# Patient Record
Sex: FEMALE | Race: WHITE
[De-identification: names, ages, dates, MRNs, and addresses within clinical notes are randomized per-mention and may not be internally consistent; named-entity substitution may affect disease eponyms.]

---

## 2018-09-13 ENCOUNTER — HOSPITAL ENCOUNTER (OUTPATIENT)
Dept: HOSPITAL 61 - PNCL | Age: 28
Discharge: HOME | End: 2018-09-13
Attending: ANESTHESIOLOGY
Payer: COMMERCIAL

## 2018-09-13 DIAGNOSIS — Z88.6: ICD-10-CM

## 2018-09-13 DIAGNOSIS — M50.123: Primary | ICD-10-CM

## 2018-09-13 PROCEDURE — 62321 NJX INTERLAMINAR CRV/THRC: CPT

## 2018-09-13 NOTE — PAIN
DATE OF SERVICE:  09/13/2018



PROGRESS NOTE FOR PAIN CLINIC



DIAGNOSIS:  Cervical radiculopathy with cervical degenerative disk disease.



HISTORY OF PRESENT ILLNESS:  The patient is a 28-year-old female who returns for

followup status post previous cervical epidural steroid injection, last seen

11/09/2017.  The patient did very well with about 90% improvement after the last

injection, but the patient reports the pain is returning now.  The patient

recently had a child about 2 months ago and during the pregnancy was getting

much worse with the pain in the neck and the shoulders bilaterally into the

upper extremities, slightly more on the left than the right, but present

bilaterally at base of the neck, shoulders, upper arms, forearms, mostly in the

posterior aspect radiating into the fingers with numbness and tingling.  The

patient reports it is radiating, shooting, aching now, on and off in intensity,

but dull and throbbing in the neck as well.  The patient reports it is a 7 on a

scale of 10 at its worst, 5 on average, 2 at its least and is a 5 today.  The

patient reports no new motor or sensory deficits, better with resting, worse

with activity, changing positions, using her upper extremities, lifting any

items especially the baby and doing repetitive motions overhead.  The patient

reports no new motor and sensory deficits.  Again, sleeps well at night.



PHYSICAL EXAMINATION:

VITAL SIGNS:  The patient's blood pressure 139/79, pulse 84, respirations 18,

temperature 98.6 degrees Fahrenheit, height is 5 feet 2 inches, weight is 217

pounds.

GENERAL:  The patient is awake, alert, oriented, appropriate, very pleasant

demeanor.

HEENT:  Head shows normocephalic, atraumatic.  Extraocular movements are intact

and symmetrical.  Oral cavity:  Mucous membranes are moist and pink.  Dentition

is intact.

NECK:  Shows anterior throat supple without palpable lymphadenopathy noted. 

Swallow reflex is symmetrical.

CHEST:  Shows normal on inspection.  Breath sounds are clear to auscultation

bilaterally.

HEART:  Shows S1, S2 clear.  No murmurs auscultated.

ABDOMEN:  Soft, nontender, nondistended.  No palpable organomegaly is noted.  No

rebound or guarding demonstrated.

MUSCULOSKELETAL:  Back shows spine grossly in the midline.  Normal appearing

cervical lordotic curvature, minor increase in thoracic kyphotic curvature. 

Cervical paraspinous muscle shows symmetrical on inspection, shows moderate

tenderness with palpation in the inferior aspect of the cervical paraspinous

musculature, but only diffusely without radiation.  The patient's neck shows

good rotational motion.  Minor tenderness with extension, but not with forward

flexion.  The patient's upper extremities show deep tendon reflexes at 2+ in the

biceps and triceps tendons.  Motor exam is strong with  strength rated at

5/5, as is bicep and tricep flexion and symmetrical.  Peripheral pulses are 1+

posterior tibia.  No peripheral edema is noted.



Options were discussed with the patient.  The patient's old chart was reviewed

as her current medication regimen updated.  Current review of systems updated

today as well.  We will proceed with a cervical epidural steroid injection today

with fluoroscopic guidance.  Risks were again discussed including, but not

limited to bleeding, infection, possibility of epidural hematoma and subsequent

neurological compromise, dural puncture, headaches, spinal cord and/or nerve

damage, side effects of steroid medication and poor results regarding pain

control.  The patient understands and wished to proceed.  The patient will

return to the clinic in approximately 2 weeks for followup, was counseled on

return appointment, activity level and side effects to be aware of.



DIAGNOSIS:  Cervical radiculopathy with cervical degenerative disk disease.



PROCEDURE:  Cervical epidural steroid injection in translaminar approach at

C6-C7 level using C-arm fluoroscopic guidance under sterile prep and drape using

local anesthetic.



MEDICATION INJECTED:  A total of 120 mg Depo-Medrol plus 5 mL of

preservative-free normal saline and 2 mL of Isovue for contrast.



CONDITION AT DISCHARGE:  Stable.  The patient tolerated the procedure well, had

no complications.

 



______________________________

VIVIENNE SANDERS MD



DR:  PENNY/antonina  JOB#:  [id number] / [id number]

DD:  09/13/2018 12:04  DT:  09/13/2018 17:06

## 2018-12-28 ENCOUNTER — HOSPITAL ENCOUNTER (OUTPATIENT)
Dept: HOSPITAL 61 - PNCL | Age: 28
Discharge: HOME | End: 2018-12-28
Attending: ANESTHESIOLOGY
Payer: COMMERCIAL

## 2018-12-28 DIAGNOSIS — M50.123: Primary | ICD-10-CM

## 2018-12-28 DIAGNOSIS — Z88.8: ICD-10-CM

## 2018-12-28 PROCEDURE — 62321 NJX INTERLAMINAR CRV/THRC: CPT

## 2018-12-28 NOTE — PAIN
DATE OF SERVICE:  12/28/2018



PROGRESS NOTE FOR PAIN CLINIC



DIAGNOSES:  Cervical radiculopathy with cervical degenerative disk disease.



HISTORY OF PRESENT ILLNESS:  The patient is a 28-year-old female who returns for

followup status post cervical epidural steroid injection x 1.  The patient

reports about 40% improvement after the first injection, still some pain in the

base of the neck and right greater than left upper extremities, radiating to the

arms and hands with some tingling, cramping, aching, sharpness type pain,

shooting pain becoming more constant, but better after the last injection.  The

patient reports she was increasing her activity at work as well as household

activities, has a new baby at home as well and has been increasing her activity

with that duty.  Also, the patient reports no new motor or sensory deficits, no

new changes.  The patient reports her pain is an 8 on a scale of 10 at its

worst, 5 on average and 1 at its least and is a 5 today.



PHYSICAL EXAMINATION:

VITAL SIGNS:  The patient's blood pressure 129/78, pulse 84, respirations 16,

temperature is 98.4 degrees Fahrenheit, height is 5 feet 2 inches, weight is 221

pounds.

GENERAL:  The patient is awake, alert, oriented, appropriate, very pleasant

demeanor.

HEENT:  Head shows normocephalic, atraumatic.  Extraocular movements intact and

symmetrical.  Oral cavity:  Mucous membranes moist and pink.  Dentition is

intact.

NECK:  Shows anterior throat supple without palpable lymphadenopathy noted. 

Swallow reflex symmetrical.

CHEST:  Shows normal on inspection.  Breath sounds clear to auscultation

bilaterally.

HEART:  Shows S1, S2 clear.  No murmurs auscultated.

ABDOMEN:  Soft, nontender, nondistended.  No palpable organomegaly is noted.  No

rebound or guarding demonstrated.

BACK:  Shows spine grossly in the midline, normal-appearing cervical lordotic

curvature.  Some increase in thoracic kyphotic curvature.  Cervical paraspinous

muscle shows symmetrical on inspection, on palpation shows some moderate

tenderness but only diffusely bilaterally without radiation.

EXTREMITIES:  The patient's upper extremities show deep tendon reflexes 2+ in

the biceps and triceps tendons.  Motor exam is strong with 5/5  strength,

bicep and tricep flexion and equal.  Peripheral pulses are 2+ in radial

distribution.  The patient's neck shows good rotational motion both laterally

greater than 45 degrees right and left as well as full extension, full forward

flexion without significant pain reported.



PLAN:  Options were discussed with the patient.  The patient's old chart was

reviewed as her current medication regimen updated.  Current review of systems

updated today as well.  We will proceed with a second in this series of cervical

epidural steroid injection today with fluoroscopic guidance.  Risks were again

discussed including, but not limited to bleeding, infection, possibility of

epidural hematoma, subsequent neurological compromise, dural puncture,

headaches, spinal cord and/or nerve damage, side effects of steroid medication

and poor results regarding pain control.  The patient understands and wished to

proceed.  The patient will return to the clinic in approximately 2 weeks for

followup, was counseled on return appointment, activity level and side effects

to be aware of.



DIAGNOSES:  Cervical radiculopathy with cervical degenerative disk disease.



PROCEDURE:  Cervical epidural steroid injection, translaminar approach at C6-C7

level using C-arm fluoroscopic guidance under sterile prep and drape using local

anesthetic.



MEDICATION INJECTED:  A total of 120 mg Depo-Medrol plus 5 mL of

preservative-free normal saline and 2 mL of Isovue for contrast.



CONDITION AT CONDITION AT DISCHARGE:  Stable.  The patient tolerated the

procedure well, had no complications.

 



______________________________

VIVIENNE ASNDERS MD



DR:  PENNY/antonina  JOB#:  [id number] / [id number]

DD:  12/28/2018 11:00  DT:  12/28/2018 11:17

## 2021-07-27 ENCOUNTER — HOSPITAL ENCOUNTER (OUTPATIENT)
Dept: HOSPITAL 61 - PNCL | Age: 31
Discharge: HOME | End: 2021-07-27
Attending: ANESTHESIOLOGY
Payer: COMMERCIAL

## 2021-07-27 DIAGNOSIS — M50.10: Primary | ICD-10-CM

## 2021-07-27 PROCEDURE — G0463 HOSPITAL OUTPT CLINIC VISIT: HCPCS

## 2021-07-27 PROCEDURE — 99212 OFFICE O/P EST SF 10 MIN: CPT

## 2021-07-27 NOTE — PDOC
Progress Note - Pain Clinic


Date of Service:


DOS:


DATE: 7/27/21 


TIME: 11:01





Diagnosis:


Dx:


Cervical radiculopathy with cervical degenerative disc disease





History or Present Illness:


HPI:


31-year-old female returns follow-up status post cervical epidural steroid 

injections last seen December 2018.  Patient reports she did very well about 90%

improvement for about a year following the injection patient reports that over 

the past year though she has had some increasing pain the base the neck and 

shoulders right upper extremity left upper extremity equal somewhat worse on the

right at times but is right-hand dominant and feels it may be just overuse with 

the right upper extremity.  Patient reports otherwise the pain is radiating the 

posterior deltoid posterior triceps into the forearms mostly the posterior side 

but also anteriorly and into the fingers and hands with numbness and tingling.  

Patient reports some fatigability but no overt motor loss of the upper 

extremities with the pain or activity.  Patient report is worse with repetitive 

activity reaching, reaching up overhead with her hands driving repetitive 

motions and weightbearing and lifting.  Patient reports generally better with 

resting or laying down does not generally awaken her from sleep.  Patient rates 

her pain as an 8 on scale 10 is worse over the past week 6 on average 3 at its 

least and is a 3 today.  Patient is still doing some chiropractic treatment 

regularly also doing stretching strengthening exercises on a regular basis.  

Patient has been taking Zanaflex and exercising Tylenol which does decrease the 

pain by about 50% when combined.





Physical Exam:


VS:


Blood pressure is 156/89 pulse 107 respirations 18 temperature is 99.1 F height

is 5 feet 2 inches weight is 204 pounds


PE:


PHYSICAL EXAMINATION:





GENERAL: The patient is awake, alert, oriented, appropriate, very pleasant in 

demeanor.


HEENT: Shows normocephalic, atraumatic.  Extraocular movements are intact and 

symmetrical.  Oral cavity: Mucous membranes moist and pink.  Dentition is 

intact.


NECK: Shows anterior throat supple without palpable lymphadenopathy noted.  

Swallow reflex symmetrical.


CHEST: Shows normal on inspection.  Breath sounds are clear bilaterally, distant

but no rales rhonchi or wheezes auscultated.


HEART: Shows S1, S2 clear.  No murmurs auscultated.


ABDOMEN: Soft, nontender, nondistended, obese.  No palpable organomegaly is 

noted.  No rebound or guarding demonstrated.


BACK: Shows spine grossly in the midline.  Normal-appearing cervical lordotic 

curvature.  Cervical paraspinous muscles show symmetrical on inspection with 

palpation some moderate tenderness diffusely in the middle and lower 

distribution the paraspinous muscles bilaterally right equal to left without 

specific trigger points atrophy hypertrophy.  Patient shows full rotation motion

cervical spine both laterally as well as full extension full forward flexion 

without significant increase in pain.  There is slightly increased thoracic 

kyphosis, some minor flattening of the lumbar lordotic curvature.  Lumbar 

paraspinous muscles show symmetrical on inspection, on palpation shows some 

moderate tenderness diffusely throughout the upper, middle and lower 

distribution of the paraspinous muscles without specific trigger points, without

radiation of pain.  The patient has good rotational motion of the lumbar spine, 

both laterally as well as extension and flexion without significant difficulty. 

No tenderness over the spinous processes, sacrum or sacroiliac regions.


EXTREMITIES: Upper extremities show deep tendon reflexes 2+ in the biceps and 

triceps tendons.  Motor exam is 5 on a scale of 5 with right  strength, oliverio

ps and tricep flexion and 5/5 on the left.  Peripheral pulses are 2+ radial.  No

peripheral edema is noted bilaterally.  Upper extremities are warm and dry to 

touch, equal in color and appearance.  


SKIN: Shows warm and dry, good turgor.  No edema.  No sores, rashes or bruising 

throughout.





Procedure:


Procedure:


Options were discussed with the patient.  Patient chart was reviewed as her 

current medication regimen updated current review of systems updated today as 

well.  We will preauthorize patient for a cervical epidural steroid injection 

she did very well with these in the past with pain returning now in the 

bilateral upper extremities and radicular fashion following a C6-7 dermatomal 

distribution bilaterally.  Patient will be given Medrol Dosepak in the meantime 

with instructions side effects to be aware of discussed.  She will return once 

preauthorized and plan on translaminar approach C6-7 level cervical epidural 

steroid injection with fluoroscopic guidance.





Medication Injected:


Med Injected:


None





Condition at Discharge:


Condition at Discharge:


Condition at discharge is stable.











VIVIENNE SANDERS MD               Jul 27, 2021 11:06

## 2021-08-19 ENCOUNTER — HOSPITAL ENCOUNTER (OUTPATIENT)
Dept: HOSPITAL 61 - KCIC MRI | Age: 31
End: 2021-08-19
Attending: ANESTHESIOLOGY
Payer: COMMERCIAL

## 2021-08-19 DIAGNOSIS — M48.02: ICD-10-CM

## 2021-08-19 DIAGNOSIS — M54.12: Primary | ICD-10-CM

## 2021-08-19 DIAGNOSIS — M47.812: ICD-10-CM

## 2021-08-19 DIAGNOSIS — M25.78: ICD-10-CM

## 2021-08-19 PROCEDURE — 72141 MRI NECK SPINE W/O DYE: CPT

## 2021-08-19 NOTE — KCIC
MR CERVICAL SPINE WO 



DATE: 8/19/2021 11:30 AM



INDICATION:  BILATERIAL CERVICAL RADICULOPATHY. Chronic neck pain and BUE pain, numbness, tingling, b
urning.  



TECHNIQUE: Multiplanar multisequence magnetic resonance imaging of the cervical spine was performed w
ithout administration of intravenous contrast using the standard cervical spine protocol.



COMPARISON:  3/3/2016.



FINDINGS:



The cervical spine is normally aligned. No acute fracture.  Mild multilevel degenerative disc desicca
tion and disc height loss. Bone marrow signal intensity is normal.



The spinal cord is normal in signal intensity.  



On the limited views of the cranial cavity and brain, the cerebellum and medina have normal morphology 
and signal characteristics. No Chiari malformation.



No soft tissue abnormality. Normal signal voids are present in the vertebral arteries.



C2-3: No significant spinal canal stenosis or neural foraminal narrowing.



C3-4: No significant spinal canal stenosis or neural foraminal narrowing.



C4-5: Disc osteophyte complex. No spinal canal stenosis. No neural foraminal.



C5-6: Disc osteophyte complex. Moderate spinal canal stenosis. No neural foraminal.



C6-7: Disc osteophyte complex. No significant spinal canal stenosis or neural foraminal narrowing.



C7-T1: No significant spinal canal stenosis or neural foraminal narrowing.



IMPRESSION: 



Cervical spondylosis, not progressed from the prior exam.



Electronically signed by: Rubio Partida MD (8/19/2021 2:31 PM) [facility]

## 2021-10-21 ENCOUNTER — HOSPITAL ENCOUNTER (OUTPATIENT)
Dept: HOSPITAL 61 - PNCL | Age: 31
Discharge: HOME | End: 2021-10-21
Attending: ANESTHESIOLOGY
Payer: COMMERCIAL

## 2021-10-21 DIAGNOSIS — Z79.899: ICD-10-CM

## 2021-10-21 DIAGNOSIS — Z88.8: ICD-10-CM

## 2021-10-21 DIAGNOSIS — M50.10: Primary | ICD-10-CM

## 2021-10-21 PROCEDURE — 62321 NJX INTERLAMINAR CRV/THRC: CPT

## 2021-10-21 NOTE — PDOC4
Procedure Note:


ICD 10 Code:


ICD 10 Code:


M54.12


M50.30





Procedure Note:


Patient was consented for cervical epidural steroid injection with fluoroscopic 

guidance.  Risks were discussed including but not limited to: Bleeding, 

infection, possibility of epidural hematoma and subsequent neurological 

compromise, dural puncture, headaches, spinal cord and/or nerve damage, side 

effects of steroid medication, and poor results regarding pain control.  Patient

understands and wished to proceed.


Procedure cervical epidural steroid injection at the C6-7 level, using local 

anesthetic under sterile prep and drape using C-arm fluoroscopic guidance under 

local anesthesia medications injected  ;120 mg Depo-Medrol +5 mL  normal saline 

and 2 mL contrast; condition at discharge is stable patient tolerated procedure 

well. and had no complications











VIVIENNE SANDERS MD               Oct 21, 2021 09:45

## 2021-10-21 NOTE — PDOC
Progress Note - Pain Clinic


Date of Service:


DOS:


DATE: 10/21/21 


TIME: 09:41





Diagnosis:


Dx:


Cervical radiculopathy with cervical degenerative disc disease





History or Present Illness:


HPI:


31-year-old female returns with complaints of pain base the neck and bilateral 

upper extremities also in the base of the skull in the posterior neck causing 

some headaches as well as the upper mid back patient reports pain is worse in 

the upper extremities radiating both right and left essentially equally into the

arms and forearms mostly the posterior aspect also in the anterior aspect the 

biceps as well as the triceps and the anterior and posterior forearms to the 

hands bilaterally.  Patient report is worse with repetitive motions 

weightbearing raising the arms over her head and weightbearing with reaching 

forward patient reports is tingling aching sharp tight radiating the upper 

extremities can be stabbing the base the neck patient reports the pain is a 9 on

scale 10 is worse over the past week 6 on average and a 5 its least and is a 6 

today.  Patient reports no loss of motor function but significant fatigability 

of the upper extremities bilaterally with repetitive motions.  Patient reports 

is better with laying down resting supporting her arms and is generally not 

awaken her from sleep at night.  Patient reports no bowel or bladder 

incontinence.





Physical Exam:


VS:


Blood pressure is 133/72 pulse 84 respirations 18 temperature 98.8 F height 5 

feet 2 inches weight is 211 pounds


PE:


PHYSICAL EXAMINATION:





GENERAL: The patient is awake, alert, oriented, appropriate, very pleasant in 

demeanor


HEENT: Shows normocephalic, atraumatic.  Extraocular movements are intact and 

symmetrical.  Oral cavity: Mucous membranes moist and pink.  Dentition is 

intact.


NECK: Shows anterior throat supple without palpable lymphadenopathy noted.  

Swallow reflex symmetrical.


CHEST: Shows normal on inspection.  Breath sounds are clear bilaterally, distant

but no rales or rhonchi auscultated.


HEART: Shows S1, S2 clear.  No murmurs auscultated.


ABDOMEN: Soft, nontender, nondistended, obese.  No palpable organomegaly is 

noted.  


BACK: Shows spine grossly in the midline.  Normal-appearing cervical lordotic 

curvature.  Cervical paraspinous muscles show symmetrical inspection, on 

palpation some moderate tenderness diffusely bilaterally diffusely without 

significant radiation.  Patient has full rotation motion cervical spine both 

laterally greater than 45 degrees closer to 90 degrees with full extension full 

forward flexion without significant difficulty.  There is slightly increased 

thoracic kyphosis, some minor flattening of the lumbar lordotic curvature. 


EXTREMITIES: Upper extremities show deep tendon reflexes 2+ in the biceps and 

triceps tendons.  Motor exam is 5 on a scale of 5 with right , biceps and 

tricep flexion and 5/5 on the left.  Peripheral pulses are 2+ radial.  No 

peripheral edema is noted bilaterally.  Upper extremities are warm and dry to 

touch, equal in color and appearance. 


SKIN: Shows warm and dry, good turgor.  No edema.  No sores, rashes or bruising 

throughout.





Procedure:


Procedure:


Options were discussed with patient.  Patient chart reviews her current 

medication regimen updated current review of systems updated today as well.  We 

will proceed with a cervical epidural steroid injection stable fluoroscopic 

guidance.  Risks were discussed including but not limited to: Bleeding, 

infection, possibility of epidural hematoma and subsequent neurological 

compromise, dural puncture, headaches, spinal cord and/or nerve damage, side 

effects of steroid medication, and poor results regarding pain control.  Patient

understands and wished to proceed.  Patient will return to clinic in approximate

2 weeks for follow-up, was counseled return appointment, activity level, and 

side effects to be aware of.





Medication Injected:


Med Injected:


Procedure cervical epidural steroid injection at the C6-7 level, using local 

anesthetic under sterile prep and drape using C-arm fluoroscopic guidance under 

local anesthesia medications injected  ;120 mg Depo-Medrol +5 mL  normal saline 

and 2 mL contrast; condition at discharge is stable patient tolerated procedure 

well. and had no complications





Condition at Discharge:


Condition at Discharge:


Condition at discharge is stable, patient tolerated procedure well and had no 

complications.











VIVIENNE SANDERS MD               Oct 21, 2021 09:45

## 2022-02-04 ENCOUNTER — HOSPITAL ENCOUNTER (OUTPATIENT)
Dept: HOSPITAL 61 - PNCL | Age: 32
Discharge: HOME | End: 2022-02-04
Attending: ANESTHESIOLOGY
Payer: COMMERCIAL

## 2022-02-04 DIAGNOSIS — M54.12: ICD-10-CM

## 2022-02-04 DIAGNOSIS — Z79.899: ICD-10-CM

## 2022-02-04 DIAGNOSIS — Z79.84: ICD-10-CM

## 2022-02-04 DIAGNOSIS — M50.10: Primary | ICD-10-CM

## 2022-02-04 PROCEDURE — 62321 NJX INTERLAMINAR CRV/THRC: CPT

## 2022-02-04 NOTE — PDOC4
Procedure Note:


ICD 10 Code:


ICD 10 Code:


M54.12


M50.30





Procedure Note:


Patient was consented for cervical epidural steroid injection with fluoroscopic 

guidance.  Risks were discussed including but not limited to: Bleeding, 

infection, possibility of epidural hematoma and subsequent neurological 

compromise, dural puncture, headaches, spinal cord and/or nerve damage, side 

effects of steroid medication, and poor results regarding pain control.  Patient

understands and wished to proceed.


Procedure cervical epidural steroid injection at the C6-7 level, using local 

anesthetic under sterile prep and drape using C-arm fluoroscopic guidance under 

local anesthesia medications injected  ;120 mg Depo-Medrol +5 mL  normal saline 

and 2 mL contrast; condition at discharge is stable patient tolerated procedure 

well. and had no complications











VIVIENNE SANDERS MD                Feb 4, 2022 11:12

## 2022-02-04 NOTE — PDOC
Progress Note - Pain Clinic


Date of Service:


DOS:


DATE: 2/4/22 


TIME: 11:06





Diagnosis:


Dx:


Cervical radiculopathy with cervical degenerative disc disease





History or Present Illness:


HPI:


31-year-old female returns for follow-up status post cervical epidural steroid 

injection last seen October 21, 2021 patient did very about 75% improvement for 

about 4 months patient reports pain is beginning to return now is in the base of

neck and shoulders with some new pain in the mid back and now which is much more

pronounced patient reports no recent injury or accident but increased pain 

between the shoulder blades which is new for her and is much more sharp and 

stabbing patient reports is worse with repetitive motions reaching with her arms

lifting reaching up over her head with both of the arms patient reports 

essentially is equal right and left with the radiating pain into the upper 

extremities which radiates into the shoulders and in the biceps triceps into the

forearms but less frequently than in the neck itself patient reports is an 8 on 

scale 10 is worse over the past week 5-6 on average to its least and is a 6 

today patient what is aching tight shooting tingling cramping and stabbing pain 

in the mid upper back between the shoulder blades patient reports is a burning 

sensation in this area as well but does not significantly radiate.  Patient 

reports is better with sitting or laying down but does awaken her from sleep at 

least once or twice at night.  Patient reports no loss of motor function no 

difficulty with fine motor movement she has not been dropping items with the 

upper extremities.





Physical Exam:


VS:


Blood pressure is 123/71 pulse 96 respirations are 18 temperature 98.1 F height

is 5 foot 3 inches weight is 208 pounds.


PE:


PHYSICAL EXAMINATION:





GENERAL: The patient is awake, alert, oriented, appropriate, very pleasant in 

demeanor


HEENT: Shows normocephalic, atraumatic.  Extraocular movements are intact and 

symmetrical.  Oral cavity: Mucous membranes moist and pink.  Dentition is 

intact.


NECK: Shows anterior throat supple without palpable lymphadenopathy noted.  

Swallow reflex symmetrical.


CHEST: Shows normal on inspection.  Breath sounds are clear bilaterally, distant

but no rales or rhonchi.


HEART: Shows S1, S2 clear.  No murmurs auscultated.


ABDOMEN: Soft, nontender, nondistended.  No palpable organomegaly is noted. 


BACK: Shows spine grossly in the midline.  Normal-appearing cervical lordotic 

curvature.  Cervical paraspinous muscles show symmetrical with inspection, on 

palpation some moderate tenderness diffusely bilaterally diffusely without 

significant radiation.  Patient is good rotation motion cervical spine both 

laterally as well as full extension full forward flexion without significant 

increase in pain as well.  There is slightly increased thoracic kyphosis, with 

significant tenderness with palpation in the rhomboid distribution between the 

shoulder blades very firm and tender musculature diffusely bilaterally right and

left but without specific trigger points and without specific radiation.  Some 

minor flattening of the lumbar lordotic curvature.  


EXTREMITIES: Upper extremities show deep tendon reflexes 2 in the the biceps and

triceps tendons.  Motor exam is 5 on a scale of 5 with right , biceps and 

tricep flexion and 5/5 on the left.  Peripheral pulses are 2+ radial.  No 

peripheral edema is noted bilaterally.  Upper extremities are warm and dry to 

touch, equal in color and appearance.  Shoulder shrug strong intact without loss

strength on resistance bilaterally.


SKIN: Shows warm and dry, good turgor.  No edema.  No sores, rashes or bruising 

throughout.





Procedure:


Procedure:


Options were discussed with the patient.  Patient's old chart was reviewed as 

her current medication regimen updated current review of systems updated today 

as well.  We will proceed with a cervical epidural steroid injection today with 

fluoroscopic guidance.  Risks were discussed including but not limited to: 

Bleeding, infection, possibility of epidural hematoma and subsequent 

neurological compromise, dural puncture, headaches, spinal cord and/or nerve 

damage, side effects of steroid medication, and poor results regarding pain 

control.  Patient understands and wished to proceed.  Patient will return to 

clinic in approximate 2 weeks for follow-up, was counseled as to return 

appointment, activity level, and side effect to be aware of.





Medication Injected:


Med Injected:


Procedure cervical epidural steroid injection at the C6-7 level, using local 

anesthetic under sterile prep and drape using C-arm fluoroscopic guidance under 

local anesthesia medications injected  ;120 mg Depo-Medrol +5 mL  normal saline 

and 2 mL contrast; condition at discharge is stable patient tolerated procedure 

well. and had no complications





Condition at Discharge:


Condition at Discharge:


Condition at discharge is stable, patient tolerated the procedure well and had 

no complications.











VIVIENNE SANDERS MD                Feb 4, 2022 11:12